# Patient Record
Sex: FEMALE | Race: WHITE | Employment: FULL TIME | URBAN - METROPOLITAN AREA
[De-identification: names, ages, dates, MRNs, and addresses within clinical notes are randomized per-mention and may not be internally consistent; named-entity substitution may affect disease eponyms.]

---

## 2019-07-22 PROBLEM — N94.19 DYSPAREUNIA DUE TO MEDICAL CONDITION IN FEMALE PATIENT: Status: ACTIVE | Noted: 2019-07-22

## 2019-09-19 PROBLEM — E04.1 THYROID CYST: Status: ACTIVE | Noted: 2019-09-19

## 2019-09-19 PROBLEM — Z86.39 HISTORY OF DIABETES INSIPIDUS: Status: ACTIVE | Noted: 2019-09-19

## 2020-09-09 PROBLEM — E55.9 VITAMIN D DEFICIENCY: Status: ACTIVE | Noted: 2020-09-09

## 2021-05-25 ENCOUNTER — HOSPITAL ENCOUNTER (OUTPATIENT)
Dept: PHYSICAL THERAPY | Age: 26
Discharge: HOME | End: 2021-05-25

## 2021-05-25 NOTE — PROGRESS NOTES
Corey Jiménez  : 1995  Primary: [facility]  Secondary:  [facility] [place] [address], [address], [place] [address]  [phone]   [phone]        OUTPATIENT DAILY NOTE    NAME/AGE/GENDER: Corey Jiménez is a 27 y.o. female. DATE: 2021    Ms. Sonali Cabellours for today's appointment due to unknown reasons. This was her initial evaluation.     Carmen Carver, PT, DPT

## 2022-01-13 PROBLEM — Z98.890 H/O LEEP: Status: ACTIVE | Noted: 2022-01-13

## 2022-01-13 PROBLEM — Z34.90 PREGNANCY: Status: ACTIVE | Noted: 2022-01-13

## 2022-01-21 PROBLEM — U07.1 COVID-19: Status: ACTIVE | Noted: 2022-01-21

## 2022-02-09 PROBLEM — Z87.42 HISTORY OF ABNORMAL CERVICAL PAP SMEAR: Status: ACTIVE | Noted: 2022-02-09

## 2022-02-09 PROBLEM — N94.819 VULVODYNIA: Status: ACTIVE | Noted: 2022-02-09

## 2022-03-18 PROBLEM — N94.19 DYSPAREUNIA DUE TO MEDICAL CONDITION IN FEMALE PATIENT: Status: ACTIVE | Noted: 2019-07-22

## 2022-03-18 PROBLEM — Z87.42 HISTORY OF ABNORMAL CERVICAL PAP SMEAR: Status: ACTIVE | Noted: 2022-02-09

## 2022-03-18 PROBLEM — E04.1 THYROID CYST: Status: ACTIVE | Noted: 2019-09-19

## 2022-03-18 PROBLEM — Z98.890 H/O LEEP: Status: ACTIVE | Noted: 2022-01-13

## 2022-03-19 PROBLEM — Z86.39 HISTORY OF DIABETES INSIPIDUS: Status: ACTIVE | Noted: 2019-09-19

## 2022-03-19 PROBLEM — N94.819 VULVODYNIA: Status: ACTIVE | Noted: 2022-02-09

## 2022-03-19 PROBLEM — E55.9 VITAMIN D DEFICIENCY: Status: ACTIVE | Noted: 2020-09-09

## 2022-03-19 PROBLEM — Z34.90 PREGNANCY: Status: ACTIVE | Noted: 2022-01-13

## 2022-03-20 PROBLEM — U07.1 COVID-19: Status: ACTIVE | Noted: 2022-01-21

## 2022-05-26 DIAGNOSIS — E03.9 PRIMARY HYPOTHYROIDISM: Primary | ICD-10-CM

## 2022-05-26 DIAGNOSIS — E55.9 VITAMIN D DEFICIENCY: ICD-10-CM
